# Patient Record
Sex: MALE | Race: WHITE | NOT HISPANIC OR LATINO | Employment: OTHER | URBAN - METROPOLITAN AREA
[De-identification: names, ages, dates, MRNs, and addresses within clinical notes are randomized per-mention and may not be internally consistent; named-entity substitution may affect disease eponyms.]

---

## 2023-05-30 ENCOUNTER — HOSPITAL ENCOUNTER (OUTPATIENT)
Dept: DATA CONVERSION | Facility: HOSPITAL | Age: 68
End: 2023-05-30
Attending: UROLOGY | Admitting: UROLOGY
Payer: MEDICARE

## 2023-05-30 DIAGNOSIS — M79.7 FIBROMYALGIA: ICD-10-CM

## 2023-05-30 DIAGNOSIS — R33.9 RETENTION OF URINE, UNSPECIFIED: ICD-10-CM

## 2023-05-30 DIAGNOSIS — Z86.73 PERSONAL HISTORY OF TRANSIENT ISCHEMIC ATTACK (TIA), AND CEREBRAL INFARCTION WITHOUT RESIDUAL DEFICITS: ICD-10-CM

## 2023-05-30 DIAGNOSIS — E78.5 HYPERLIPIDEMIA, UNSPECIFIED: ICD-10-CM

## 2023-05-30 DIAGNOSIS — N40.1 BENIGN PROSTATIC HYPERPLASIA WITH LOWER URINARY TRACT SYMPTOMS: ICD-10-CM

## 2023-06-05 LAB
COMPLETE PATHOLOGY REPORT: NORMAL
CONVERTED CLINICAL DIAGNOSIS-HISTORY: NORMAL
CONVERTED FINAL DIAGNOSIS: NORMAL
CONVERTED FINAL REPORT PDF LINK TO COPY AND PASTE: NORMAL
CONVERTED GROSS DESCRIPTION: NORMAL

## 2023-09-07 VITALS — HEIGHT: 68 IN | BODY MASS INDEX: 19.28 KG/M2 | WEIGHT: 127.21 LBS

## 2023-09-30 NOTE — H&P
History & Physical Reviewed:   I have reviewed the History and Physical dated:  15-May-2023   History and Physical reviewed and relevant findings noted. Patient examined to review pertinent physical  findings.: No significant changes   Home Medications Reviewed: no changes noted   Allergies Reviewed: no changes noted       ERAS (Enhanced Recovery After Surgery):  ·  ERAS Patient: no     Consent:   COVID-19 Consent:  ·  COVID-19 Risk Consent Surgeon has reviewed key risks related to the risk of jose COVID-19 and if they contract COVID-19 what the risks are.     Attestation:   Note Completion:  I am a:  Resident/Fellow   Attending Attestation I saw and evaluated the patient.  I personally obtained the key and critical portions of the history and physical exam or was physically present for key and  critical portions performed by the resident/fellow. I reviewed the resident/fellow?s documentation and discussed the patient with the resident/fellow.  I agree with the resident/fellow?s medical decision making as documented in the note.     I personally evaluated the patient on 30-May-2023         Electronic Signatures:  Ag Vargas (Resident))  (Signed 30-May-2023 06:09)   Authored: History & Physical Reviewed, ERAS, Consent,  Note Completion  Eusebio Campos)  (Signed 31-May-2023 11:23)   Authored: Note Completion   Co-Signer: History & Physical Reviewed, ERAS, Consent, Note Completion      Last Updated: 31-May-2023 11:23 by Eusebio Campos)

## 2023-12-20 ENCOUNTER — APPOINTMENT (OUTPATIENT)
Dept: UROLOGY | Facility: HOSPITAL | Age: 68
End: 2023-12-20
Payer: MEDICARE

## 2023-12-27 ENCOUNTER — TELEPHONE (OUTPATIENT)
Dept: PRIMARY CARE | Facility: CLINIC | Age: 68
End: 2023-12-27
Payer: MEDICARE

## 2023-12-27 DIAGNOSIS — E55.9 VITAMIN D DEFICIENCY: ICD-10-CM

## 2023-12-27 DIAGNOSIS — N40.1 BENIGN PROSTATIC HYPERPLASIA WITH LOWER URINARY TRACT SYMPTOMS, SYMPTOM DETAILS UNSPECIFIED: ICD-10-CM

## 2023-12-27 DIAGNOSIS — D64.9 ANEMIA, UNSPECIFIED TYPE: ICD-10-CM

## 2023-12-27 DIAGNOSIS — Z00.00 HEALTHCARE MAINTENANCE: Primary | ICD-10-CM

## 2023-12-27 PROBLEM — R97.20 INCREASED PROSTATE SPECIFIC ANTIGEN (PSA) VELOCITY: Status: ACTIVE | Noted: 2023-12-27

## 2023-12-27 PROBLEM — M79.7 FIBROMYALGIA: Status: ACTIVE | Noted: 2023-12-27

## 2023-12-27 PROBLEM — D72.819 LEUKOPENIA: Status: ACTIVE | Noted: 2023-12-27

## 2023-12-27 PROBLEM — E78.5 HYPERLIPIDEMIA: Status: ACTIVE | Noted: 2023-12-27

## 2023-12-27 PROBLEM — K57.30 DIVERTICULOSIS OF COLON: Status: ACTIVE | Noted: 2023-12-27

## 2023-12-27 PROBLEM — N21.0 CALCIUM STONE OF BLADDER: Status: ACTIVE | Noted: 2023-12-27

## 2023-12-27 PROBLEM — K58.9 IBS (IRRITABLE BOWEL SYNDROME): Status: ACTIVE | Noted: 2023-12-27

## 2023-12-27 PROBLEM — K76.9 LIVER LESION: Status: ACTIVE | Noted: 2023-12-27

## 2023-12-27 PROBLEM — R20.2 PARESTHESIA: Status: ACTIVE | Noted: 2023-12-27

## 2024-01-10 ENCOUNTER — LAB (OUTPATIENT)
Dept: LAB | Facility: LAB | Age: 69
End: 2024-01-10
Payer: MEDICARE

## 2024-01-10 DIAGNOSIS — D64.9 ANEMIA, UNSPECIFIED TYPE: ICD-10-CM

## 2024-01-10 DIAGNOSIS — Z00.00 HEALTHCARE MAINTENANCE: ICD-10-CM

## 2024-01-10 DIAGNOSIS — E55.9 VITAMIN D DEFICIENCY: ICD-10-CM

## 2024-01-10 DIAGNOSIS — N40.1 BENIGN PROSTATIC HYPERPLASIA WITH LOWER URINARY TRACT SYMPTOMS, SYMPTOM DETAILS UNSPECIFIED: ICD-10-CM

## 2024-01-10 LAB
25(OH)D3 SERPL-MCNC: 31 NG/ML (ref 30–100)
ALBUMIN SERPL BCP-MCNC: 4.3 G/DL (ref 3.4–5)
ALP SERPL-CCNC: 55 U/L (ref 33–136)
ALT SERPL W P-5'-P-CCNC: 17 U/L (ref 10–52)
ANION GAP SERPL CALC-SCNC: 11 MMOL/L (ref 10–20)
AST SERPL W P-5'-P-CCNC: 18 U/L (ref 9–39)
BASOPHILS # BLD AUTO: 0.03 X10*3/UL (ref 0–0.1)
BASOPHILS NFR BLD AUTO: 0.9 %
BILIRUB SERPL-MCNC: 0.5 MG/DL (ref 0–1.2)
BUN SERPL-MCNC: 23 MG/DL (ref 6–23)
CALCIUM SERPL-MCNC: 9 MG/DL (ref 8.6–10.3)
CHLORIDE SERPL-SCNC: 102 MMOL/L (ref 98–107)
CHOLEST SERPL-MCNC: 207 MG/DL (ref 0–199)
CHOLESTEROL/HDL RATIO: 2.4
CO2 SERPL-SCNC: 29 MMOL/L (ref 21–32)
CREAT SERPL-MCNC: 0.81 MG/DL (ref 0.5–1.3)
EGFRCR SERPLBLD CKD-EPI 2021: >90 ML/MIN/1.73M*2
EOSINOPHIL # BLD AUTO: 0.08 X10*3/UL (ref 0–0.7)
EOSINOPHIL NFR BLD AUTO: 2.4 %
ERYTHROCYTE [DISTWIDTH] IN BLOOD BY AUTOMATED COUNT: 13.6 % (ref 11.5–14.5)
EST. AVERAGE GLUCOSE BLD GHB EST-MCNC: 111 MG/DL
GLUCOSE SERPL-MCNC: 97 MG/DL (ref 74–99)
HBA1C MFR BLD: 5.5 %
HCT VFR BLD AUTO: 41.2 % (ref 41–52)
HDLC SERPL-MCNC: 86.7 MG/DL
HGB BLD-MCNC: 13.7 G/DL (ref 13.5–17.5)
IMM GRANULOCYTES # BLD AUTO: 0.01 X10*3/UL (ref 0–0.7)
IMM GRANULOCYTES NFR BLD AUTO: 0.3 % (ref 0–0.9)
LDLC SERPL CALC-MCNC: 113 MG/DL
LYMPHOCYTES # BLD AUTO: 1.33 X10*3/UL (ref 1.2–4.8)
LYMPHOCYTES NFR BLD AUTO: 39.1 %
MCH RBC QN AUTO: 31.7 PG (ref 26–34)
MCHC RBC AUTO-ENTMCNC: 33.3 G/DL (ref 32–36)
MCV RBC AUTO: 95 FL (ref 80–100)
MONOCYTES # BLD AUTO: 0.32 X10*3/UL (ref 0.1–1)
MONOCYTES NFR BLD AUTO: 9.4 %
NEUTROPHILS # BLD AUTO: 1.63 X10*3/UL (ref 1.2–7.7)
NEUTROPHILS NFR BLD AUTO: 47.9 %
NON HDL CHOLESTEROL: 120 MG/DL (ref 0–149)
NRBC BLD-RTO: 0 /100 WBCS (ref 0–0)
PLATELET # BLD AUTO: 207 X10*3/UL (ref 150–450)
POTASSIUM SERPL-SCNC: 3.9 MMOL/L (ref 3.5–5.3)
PROT SERPL-MCNC: 6.3 G/DL (ref 6.4–8.2)
PSA SERPL-MCNC: 1.03 NG/ML
RBC # BLD AUTO: 4.32 X10*6/UL (ref 4.5–5.9)
SODIUM SERPL-SCNC: 138 MMOL/L (ref 136–145)
TRIGL SERPL-MCNC: 39 MG/DL (ref 0–149)
VIT B12 SERPL-MCNC: 457 PG/ML (ref 211–911)
VLDL: 8 MG/DL (ref 0–40)
WBC # BLD AUTO: 3.4 X10*3/UL (ref 4.4–11.3)

## 2024-01-10 PROCEDURE — 83036 HEMOGLOBIN GLYCOSYLATED A1C: CPT

## 2024-01-10 PROCEDURE — 82306 VITAMIN D 25 HYDROXY: CPT

## 2024-01-10 PROCEDURE — 85025 COMPLETE CBC W/AUTO DIFF WBC: CPT

## 2024-01-10 PROCEDURE — 82607 VITAMIN B-12: CPT

## 2024-01-10 PROCEDURE — 36415 COLL VENOUS BLD VENIPUNCTURE: CPT

## 2024-01-10 PROCEDURE — 80053 COMPREHEN METABOLIC PANEL: CPT

## 2024-01-10 PROCEDURE — 84153 ASSAY OF PSA TOTAL: CPT

## 2024-01-10 PROCEDURE — 80061 LIPID PANEL: CPT

## 2024-01-14 DIAGNOSIS — D72.819 LEUKOPENIA, UNSPECIFIED TYPE: Primary | ICD-10-CM

## 2024-01-22 ENCOUNTER — OFFICE VISIT (OUTPATIENT)
Dept: PRIMARY CARE | Facility: CLINIC | Age: 69
End: 2024-01-22
Payer: MEDICARE

## 2024-01-22 VITALS
DIASTOLIC BLOOD PRESSURE: 71 MMHG | WEIGHT: 139 LBS | RESPIRATION RATE: 18 BRPM | SYSTOLIC BLOOD PRESSURE: 130 MMHG | OXYGEN SATURATION: 98 % | BODY MASS INDEX: 20.59 KG/M2 | TEMPERATURE: 97.4 F | HEART RATE: 54 BPM | HEIGHT: 69 IN

## 2024-01-22 DIAGNOSIS — N40.1 BENIGN PROSTATIC HYPERPLASIA WITH URINARY FREQUENCY: ICD-10-CM

## 2024-01-22 DIAGNOSIS — E46 PROTEIN-CALORIE MALNUTRITION, UNSPECIFIED SEVERITY (MULTI): ICD-10-CM

## 2024-01-22 DIAGNOSIS — Z00.00 ROUTINE GENERAL MEDICAL EXAMINATION AT HEALTH CARE FACILITY: Primary | ICD-10-CM

## 2024-01-22 DIAGNOSIS — R35.0 BENIGN PROSTATIC HYPERPLASIA WITH URINARY FREQUENCY: ICD-10-CM

## 2024-01-22 DIAGNOSIS — D72.819 LEUKOPENIA, UNSPECIFIED TYPE: ICD-10-CM

## 2024-01-22 DIAGNOSIS — K58.0 IRRITABLE BOWEL SYNDROME WITH DIARRHEA: ICD-10-CM

## 2024-01-22 PROCEDURE — 99213 OFFICE O/P EST LOW 20 MIN: CPT | Performed by: FAMILY MEDICINE

## 2024-01-22 PROCEDURE — 1036F TOBACCO NON-USER: CPT | Performed by: FAMILY MEDICINE

## 2024-01-22 PROCEDURE — G0439 PPPS, SUBSEQ VISIT: HCPCS | Performed by: FAMILY MEDICINE

## 2024-01-22 PROCEDURE — 1125F AMNT PAIN NOTED PAIN PRSNT: CPT | Performed by: FAMILY MEDICINE

## 2024-01-22 PROCEDURE — 1159F MED LIST DOCD IN RCRD: CPT | Performed by: FAMILY MEDICINE

## 2024-01-22 PROCEDURE — 1170F FXNL STATUS ASSESSED: CPT | Performed by: FAMILY MEDICINE

## 2024-01-22 RX ORDER — TAMSULOSIN HYDROCHLORIDE 0.4 MG/1
0.8 CAPSULE ORAL DAILY
COMMUNITY

## 2024-01-22 ASSESSMENT — ACTIVITIES OF DAILY LIVING (ADL)
GROCERY_SHOPPING: INDEPENDENT
BATHING: INDEPENDENT
DRESSING: INDEPENDENT
MANAGING_FINANCES: INDEPENDENT
DOING_HOUSEWORK: INDEPENDENT
TAKING_MEDICATION: INDEPENDENT

## 2024-01-22 ASSESSMENT — PATIENT HEALTH QUESTIONNAIRE - PHQ9
1. LITTLE INTEREST OR PLEASURE IN DOING THINGS: NOT AT ALL
SUM OF ALL RESPONSES TO PHQ9 QUESTIONS 1 AND 2: 0
2. FEELING DOWN, DEPRESSED OR HOPELESS: NOT AT ALL

## 2024-01-22 ASSESSMENT — ENCOUNTER SYMPTOMS
HEADACHES: 0
OCCASIONAL FEELINGS OF UNSTEADINESS: 0
SHORTNESS OF BREATH: 0
DEPRESSION: 0
LOSS OF SENSATION IN FEET: 0

## 2024-01-22 NOTE — PROGRESS NOTES
"Subjective     Chance Chiu is a 68 y.o. male who presents for Medicare Annual Wellness Visit Subsequent.    HPI     Pt is here today for annual well exam.  Pt is doing well.  He has BPH, sees urology.  He reports being unable to sit for jury duty due to increased urination.      Review of Systems   Respiratory:  Negative for shortness of breath.    Cardiovascular:  Negative for chest pain.   Neurological:  Negative for headaches.       Objective     Vitals:    01/22/24 1426   BP: 130/71   BP Location: Right arm   Patient Position: Sitting   Pulse: 54   Resp: 18   Temp: 36.3 °C (97.4 °F)   TempSrc: Temporal   SpO2: 98%   Weight: 63 kg (139 lb)   Height: 1.753 m (5' 9\")        Current Outpatient Medications   Medication Instructions    solifenacin (VESICARE) 5 mg, oral, Daily, Swallow tablet whole; do not crush, chew, or split.    tamsulosin (FLOMAX) 0.8 mg, oral, Daily        Past Surgical History:   Procedure Laterality Date    OTHER SURGICAL HISTORY  05/15/2019    Hernia repair    OTHER SURGICAL HISTORY  05/24/2022    Prostate biopsy    OTHER SURGICAL HISTORY  05/24/2022    Insertion of prostatic urethral lift implant        Social History     Tobacco Use    Smoking status: Never    Smokeless tobacco: Never   Vaping Use    Vaping Use: Never used        No family history on file.     Immunization History   Administered Date(s) Administered    Flu vaccine, quadrivalent, high-dose, preservative free, age 65y+ (FLUZONE) 10/07/2021, 10/12/2022, 10/13/2023    Flu vaccine, quadrivalent, no egg protein, age 6 month or greater (FLUCELVAX) 11/03/2017    Influenza, injectable, quadrivalent 10/23/2019    Pfizer COVID-19 vaccine, Fall 2023, 12 years and older, (30mcg/0.3mL) 10/13/2023    Pfizer COVID-19 vaccine, bivalent, age 12 years and older (30 mcg/0.3 mL) 10/12/2022    Pfizer Gray Cap SARS-CoV-2 04/14/2022    Pfizer Purple Cap SARS-CoV-2 03/11/2021, 04/08/2021, 10/07/2021    Pneumococcal conjugate vaccine, 20-valent " (PREVNAR 20) 06/15/2022    RESPIRATORY SYNCYTIAL VIRUS (RSV), ELIGIBLE PREGNANT PTS, 0.5 ML (ABRYSVO) 10/13/2023    Zoster vaccine, recombinant, adult (SHINGRIX) 04/14/2022, 06/15/2022        Physical Exam  Vitals reviewed.   Constitutional:       General: He is not in acute distress.     Comments: Thin body habitus    HENT:      Head: Normocephalic and atraumatic.      Right Ear: Tympanic membrane, ear canal and external ear normal.      Left Ear: Tympanic membrane, ear canal and external ear normal.      Nose: Nose normal.      Mouth/Throat:      Mouth: Mucous membranes are moist.      Pharynx: Oropharynx is clear. No oropharyngeal exudate or posterior oropharyngeal erythema.   Eyes:      Extraocular Movements: Extraocular movements intact.      Pupils: Pupils are equal, round, and reactive to light.   Neck:      Thyroid: No thyroid mass or thyromegaly.   Cardiovascular:      Rate and Rhythm: Normal rate and regular rhythm.      Heart sounds: No murmur heard.  Pulmonary:      Effort: Pulmonary effort is normal. No respiratory distress.      Breath sounds: Normal breath sounds. No wheezing, rhonchi or rales.   Abdominal:      General: Abdomen is flat. There is no distension.      Palpations: Abdomen is soft.      Tenderness: There is no abdominal tenderness.   Musculoskeletal:         General: Normal range of motion.   Lymphadenopathy:      Cervical: No cervical adenopathy.   Skin:     General: Skin is warm and dry.      Findings: No rash.   Neurological:      General: No focal deficit present.      Mental Status: He is alert and oriented to person, place, and time. Mental status is at baseline.   Psychiatric:         Mood and Affect: Mood normal.         Behavior: Behavior normal.         Problem List Items Addressed This Visit       Leukopenia    Enlarged prostate with lower urinary tract symptoms (LUTS)    IBS (irritable bowel syndrome)    Protein-calorie malnutrition, unspecified severity (CMS/HCC)     Other  Visit Diagnoses       Routine general medical examination at health care facility    -  Primary            Assessment/Plan     Medicare well exam    Colon screening - cologuard negative 7/2022, repeat in 2025.      Shingrix vaccine status - utd    Pneumonia vaccine status - utd    Tdap - recommended    Healthy diet, routine exercise was discussed with patient      Living will/MPOA discussed    Screening questions completed (Fall /depression/ADL/IADL/Home Safety/Functional ability)    Unable to serve on jury duty due to BPH and IBS issues.  Letter provided.     Leukopenia - evaluated by  heme in the past     Hld - mild     BPH - sees  urology    Reviewed recent labs today     Underweight - discussed increasing protein in diet, take MVI.    Follow up in  1 year or sooner if needed

## 2024-03-06 ENCOUNTER — TELEMEDICINE (OUTPATIENT)
Dept: UROLOGY | Facility: HOSPITAL | Age: 69
End: 2024-03-06
Payer: MEDICARE

## 2024-03-06 DIAGNOSIS — N40.1 BENIGN PROSTATIC HYPERPLASIA WITH URINARY FREQUENCY: Primary | ICD-10-CM

## 2024-03-06 DIAGNOSIS — R35.0 BENIGN PROSTATIC HYPERPLASIA WITH URINARY FREQUENCY: Primary | ICD-10-CM

## 2024-03-06 DIAGNOSIS — R35.1 NOCTURIA: ICD-10-CM

## 2024-03-06 PROCEDURE — 1036F TOBACCO NON-USER: CPT | Performed by: UROLOGY

## 2024-03-06 PROCEDURE — 1125F AMNT PAIN NOTED PAIN PRSNT: CPT | Performed by: UROLOGY

## 2024-03-06 PROCEDURE — 99213 OFFICE O/P EST LOW 20 MIN: CPT | Performed by: UROLOGY

## 2024-03-06 PROCEDURE — 1159F MED LIST DOCD IN RCRD: CPT | Performed by: UROLOGY

## 2024-03-06 PROCEDURE — 1157F ADVNC CARE PLAN IN RCRD: CPT | Performed by: UROLOGY

## 2024-03-06 NOTE — PROGRESS NOTES
Virtual or Telephone Consent    An interactive audio and video telecommunication system which permits real time communications between the patient (at the originating site) and provider (at the distant site) was utilized to provide this telehealth service.   Verbal consent was requested and obtained from Chance Chiu on this date, 03/06/24 for a telehealth visit.     Virtual or Telephone Consent    A telephone visit (audio only) between the patient (at the originating site) and the provider (at the distant site) was utilized to provide this telehealth service.   Verbal consent was requested and obtained from Chance Chiu on this date, 03/06/24 for a telehealth visit.     HPI  Per Dr. Bassett note dated 6/15/23    68y  male who presents for f/u s/p HOLEP and 100 units of Botox on May 30 with Dr. Campos. He subsequently passed TOV and learned ISC as he was concerned regarding possible recurrence of retention because this has been an issue for him in the past. He states that his symptoms have slightly improved since his last visit. He is still taking tamsulosin and denies needing to self catheterize. He feels that he is emptying well. Denies SS of UTI. Pleased at this time.     Path- 6.2g benign.     6/29/23- Patient is emptying bladder well. No retention. No issues. No cath. Tried stopping flomax, but feels like he still needs it. LUTS are significantly improved from surgery, does not want to do anything different at this time.     9/27/23 - seen via thv for a 3mo fuv sp HoLEP w botox. The patient is on flomax still, wants to take 2 a day. not having to cath. stream is strong.         1/19/24 - seen via  for symptom check. Continues on Flomax. Reports continued bothersome frequency and nocturia. He is getting up to void every 2 hours and reports a weak stream during the night. His stream is strong during the day.        3/6/24 - seen today via thv for 6 week symptom check after starting Vesicare 5mg po  qd. Stopped the vesicare, no better. Has been told in the past he has a small bladder, he recalls 160 or so ml. Not really that bothered at this point.     Lab Results   Component Value Date    PSA 1.03 01/10/2024    PSA 1.79 10/12/2021    PSA 0.80 10/08/2020    PSA 1.93 04/26/2019    PSA 0.82 04/19/2018         Current Medications:  Current Outpatient Medications   Medication Sig Dispense Refill    solifenacin (VESIcare) 5 mg tablet Take 1 tablet (5 mg) by mouth once daily. Swallow tablet whole; do not crush, chew, or split. 30 tablet 5    tamsulosin (Flomax) 0.4 mg 24 hr capsule Take 2 capsules (0.8 mg) by mouth once daily.       No current facility-administered medications for this visit.        Active Problems:  Chance Chiu is a 69 y.o. male with the following Problems and Medications.  Patient Active Problem List   Diagnosis    Leukopenia    Hyperlipidemia    Enlarged prostate with lower urinary tract symptoms (LUTS)    Anemia    Diverticulosis of colon    Calcium stone of bladder    Fibromyalgia    IBS (irritable bowel syndrome)    Increased prostate specific antigen (PSA) velocity    Liver lesion    Paresthesia    Protein-calorie malnutrition, unspecified severity (CMS/HCC)     Current Outpatient Medications   Medication Sig Dispense Refill    solifenacin (VESIcare) 5 mg tablet Take 1 tablet (5 mg) by mouth once daily. Swallow tablet whole; do not crush, chew, or split. 30 tablet 5    tamsulosin (Flomax) 0.4 mg 24 hr capsule Take 2 capsules (0.8 mg) by mouth once daily.       No current facility-administered medications for this visit.       PMH:  Past Medical History:   Diagnosis Date    Personal history of other diseases of male genital organs 10/23/2019    History of benign prostatic hyperplasia    Personal history of other diseases of the digestive system     History of irritable bowel syndrome    Personal history of other diseases of the digestive system     History of diverticulosis    Personal  history of other specified conditions 03/28/2022    History of urinary retention       PSH:  Past Surgical History:   Procedure Laterality Date    OTHER SURGICAL HISTORY  05/15/2019    Hernia repair    OTHER SURGICAL HISTORY  05/24/2022    Prostate biopsy    OTHER SURGICAL HISTORY  05/24/2022    Insertion of prostatic urethral lift implant       FMH:  No family history on file.    SHx:  Social History     Tobacco Use    Smoking status: Never    Smokeless tobacco: Never   Vaping Use    Vaping Use: Never used       Allergies:  No Known Allergies    Assessment/Plan  Really no improvement on vesicare, stopped this. Discussed another med vs botox vs neuromodulation, for now is ok where he is at and would like to just keep an eye on things.    Fu 6 mo over v for symptom check    Scribe Attestation  By signing my name below, I, Idania Esparza, Scribe, attest that this documentation  has been prepared under the direction and in the presence of Eusebio Campos MD.

## 2024-03-27 ENCOUNTER — LAB (OUTPATIENT)
Dept: LAB | Facility: LAB | Age: 69
End: 2024-03-27
Payer: MEDICARE

## 2024-03-27 DIAGNOSIS — D72.819 LEUKOPENIA, UNSPECIFIED TYPE: ICD-10-CM

## 2024-03-27 LAB
BASOPHILS # BLD AUTO: 0.03 X10*3/UL (ref 0–0.1)
BASOPHILS NFR BLD AUTO: 0.7 %
EOSINOPHIL # BLD AUTO: 0.06 X10*3/UL (ref 0–0.7)
EOSINOPHIL NFR BLD AUTO: 1.5 %
ERYTHROCYTE [DISTWIDTH] IN BLOOD BY AUTOMATED COUNT: 12.9 % (ref 11.5–14.5)
HCT VFR BLD AUTO: 39.9 % (ref 41–52)
HGB BLD-MCNC: 13.7 G/DL (ref 13.5–17.5)
IMM GRANULOCYTES # BLD AUTO: 0.01 X10*3/UL (ref 0–0.7)
IMM GRANULOCYTES NFR BLD AUTO: 0.2 % (ref 0–0.9)
LYMPHOCYTES # BLD AUTO: 1.24 X10*3/UL (ref 1.2–4.8)
LYMPHOCYTES NFR BLD AUTO: 30.7 %
MCH RBC QN AUTO: 32.5 PG (ref 26–34)
MCHC RBC AUTO-ENTMCNC: 34.3 G/DL (ref 32–36)
MCV RBC AUTO: 95 FL (ref 80–100)
MONOCYTES # BLD AUTO: 0.37 X10*3/UL (ref 0.1–1)
MONOCYTES NFR BLD AUTO: 9.2 %
NEUTROPHILS # BLD AUTO: 2.33 X10*3/UL (ref 1.2–7.7)
NEUTROPHILS NFR BLD AUTO: 57.7 %
NRBC BLD-RTO: 0 /100 WBCS (ref 0–0)
PLATELET # BLD AUTO: 200 X10*3/UL (ref 150–450)
RBC # BLD AUTO: 4.22 X10*6/UL (ref 4.5–5.9)
WBC # BLD AUTO: 4 X10*3/UL (ref 4.4–11.3)

## 2024-03-27 PROCEDURE — 36415 COLL VENOUS BLD VENIPUNCTURE: CPT

## 2024-03-27 PROCEDURE — 85025 COMPLETE CBC W/AUTO DIFF WBC: CPT

## 2024-06-20 DIAGNOSIS — N40.1 BENIGN PROSTATIC HYPERPLASIA WITH URINARY FREQUENCY: Primary | ICD-10-CM

## 2024-06-20 DIAGNOSIS — R35.0 BENIGN PROSTATIC HYPERPLASIA WITH URINARY FREQUENCY: Primary | ICD-10-CM

## 2024-06-21 RX ORDER — TAMSULOSIN HYDROCHLORIDE 0.4 MG/1
0.8 CAPSULE ORAL DAILY
Qty: 180 CAPSULE | Refills: 3 | Status: SHIPPED | OUTPATIENT
Start: 2024-06-21 | End: 2025-06-21

## 2024-07-31 ENCOUNTER — TELEPHONE (OUTPATIENT)
Dept: OPHTHALMOLOGY | Facility: CLINIC | Age: 69
End: 2024-07-31
Payer: MEDICARE

## 2024-07-31 NOTE — TELEPHONE ENCOUNTER
CALLED PATIENT TO GET SCHEDULED FOR [facility] SERVICES     PER PATIENT HE HAS ALREADY BEEN SEEN AND ESTABLISHED CARE ELSEWHERE.    -awde

## 2024-09-12 ENCOUNTER — TELEMEDICINE (OUTPATIENT)
Dept: UROLOGY | Facility: HOSPITAL | Age: 69
End: 2024-09-12
Payer: MEDICARE

## 2024-09-12 DIAGNOSIS — N40.1 BENIGN PROSTATIC HYPERPLASIA WITH URINARY FREQUENCY: ICD-10-CM

## 2024-09-12 DIAGNOSIS — R35.0 BENIGN PROSTATIC HYPERPLASIA WITH URINARY FREQUENCY: ICD-10-CM

## 2024-09-12 PROCEDURE — G2211 COMPLEX E/M VISIT ADD ON: HCPCS | Performed by: UROLOGY

## 2024-09-12 PROCEDURE — 99214 OFFICE O/P EST MOD 30 MIN: CPT | Performed by: UROLOGY

## 2024-09-12 PROCEDURE — 1157F ADVNC CARE PLAN IN RCRD: CPT | Performed by: UROLOGY

## 2024-09-12 RX ORDER — TAMSULOSIN HYDROCHLORIDE 0.4 MG/1
0.8 CAPSULE ORAL DAILY
Qty: 180 CAPSULE | Refills: 3 | Status: SHIPPED | OUTPATIENT
Start: 2024-09-12 | End: 2025-09-12

## 2024-09-12 NOTE — PROGRESS NOTES
Virtual or Telephone Consent    An interactive audio and video telecommunication system which permits real time communications between the patient (at the originating site) and provider (at the distant site) was utilized to provide this telehealth service.   Verbal consent was requested and obtained from Chance Chiu on this date, 09/12/24 for a telehealth visit.     HPI    69 y.o. male being seen with the following problem list:    Problem list:  Urinary retention, now s/p HOLEP and 100 units of Botox 5/30/23    Per Dr. Bassett note dated 6/15/23   He subsequently passed TOV and learned ISC as he was concerned regarding possible recurrence of retention because this has been an issue for him in the past. He states that his symptoms have slightly improved since his last visit. He is still taking tamsulosin and denies needing to self catheterize. He feels that he is emptying well. Denies SS of UTI. Pleased at this time.     Path- 6.2g benign.     6/29/23- Patient is emptying bladder well. No retention. No issues. No cath. Tried stopping flomax, but feels like he still needs it. LUTS are significantly improved from surgery, does not want to do anything different at this time.     9/27/23 - seen via thv for a 3mo fuv sp HoLEP w botox. The patient is on flomax still, wants to take 2 a day. not having to cath. stream is strong.      1/19/24 - seen via th for symptom check. Continues on Flomax. Reports continued bothersome frequency and nocturia. He is getting up to void every 2 hours and reports a weak stream during the night. His stream is strong during the day.     3/6/24 - seen today via thv for 6 week symptom check after starting Vesicare 5mg po qd. Stopped the vesicare, no better. Has been told in the past he has a small bladder, he recalls 160 or so ml. Not really that bothered at this point.        09/12/24 - Doing well, urinary symptoms not too bothersome. Would like to continue on 0.8mg flomax. Does not want to  do anything differently      Lab Results   Component Value Date    PSA 1.03 01/10/2024    PSA 1.79 10/12/2021    PSA 0.80 10/08/2020    PSA 1.93 04/26/2019    PSA 0.82 04/19/2018         Current Medications:  Current Outpatient Medications   Medication Sig Dispense Refill    tamsulosin (Flomax) 0.4 mg 24 hr capsule Take 2 capsules (0.8 mg) by mouth once daily. 180 capsule 3     No current facility-administered medications for this visit.        Active Problems:  Chance Chiu is a 69 y.o. male with the following Problems and Medications.  Patient Active Problem List   Diagnosis    Leukopenia    Hyperlipidemia    Enlarged prostate with lower urinary tract symptoms (LUTS)    Anemia    Diverticulosis of colon    Calcium stone of bladder    Fibromyalgia    IBS (irritable bowel syndrome)    Increased prostate specific antigen (PSA) velocity    Liver lesion    Paresthesia    Protein-calorie malnutrition, unspecified severity (Multi)     Current Outpatient Medications   Medication Sig Dispense Refill    tamsulosin (Flomax) 0.4 mg 24 hr capsule Take 2 capsules (0.8 mg) by mouth once daily. 180 capsule 3     No current facility-administered medications for this visit.       PMH:  Past Medical History:   Diagnosis Date    Personal history of other diseases of male genital organs 10/23/2019    History of benign prostatic hyperplasia    Personal history of other diseases of the digestive system     History of irritable bowel syndrome    Personal history of other diseases of the digestive system     History of diverticulosis    Personal history of other specified conditions 03/28/2022    History of urinary retention       PSH:  Past Surgical History:   Procedure Laterality Date    OTHER SURGICAL HISTORY  05/15/2019    Hernia repair    OTHER SURGICAL HISTORY  05/24/2022    Prostate biopsy    OTHER SURGICAL HISTORY  05/24/2022    Insertion of prostatic urethral lift implant       FMH:  No family history on file.    SHx:  Social  History     Tobacco Use    Smoking status: Never    Smokeless tobacco: Never   Vaping Use    Vaping status: Never Used       Allergies:  No Known Allergies      Assessment/Plan  Doing well, not bothered by his urinary symptoms. Would like to continue on 0.8mg tamsulosin, will order this for him.     Will follow up in 6 months over TH.     Scribe Attestation  By signing my name below, I, Jose LuisMacy Shultz, attest that this documentation  has been prepared under the direction and in the presence of Eusebio Campos MD.

## 2024-11-05 ENCOUNTER — HOSPITAL ENCOUNTER (EMERGENCY)
Facility: HOSPITAL | Age: 69
Discharge: HOME | End: 2024-11-05
Attending: EMERGENCY MEDICINE
Payer: MEDICARE

## 2024-11-05 ENCOUNTER — OFFICE VISIT (OUTPATIENT)
Dept: URGENT CARE | Age: 69
End: 2024-11-05
Payer: MEDICARE

## 2024-11-05 ENCOUNTER — APPOINTMENT (OUTPATIENT)
Dept: CARDIOLOGY | Facility: HOSPITAL | Age: 69
End: 2024-11-05
Payer: MEDICARE

## 2024-11-05 ENCOUNTER — APPOINTMENT (OUTPATIENT)
Dept: RADIOLOGY | Facility: HOSPITAL | Age: 69
End: 2024-11-05
Payer: MEDICARE

## 2024-11-05 VITALS
HEIGHT: 69 IN | DIASTOLIC BLOOD PRESSURE: 59 MMHG | WEIGHT: 145 LBS | OXYGEN SATURATION: 97 % | RESPIRATION RATE: 16 BRPM | SYSTOLIC BLOOD PRESSURE: 118 MMHG | BODY MASS INDEX: 21.48 KG/M2 | HEART RATE: 56 BPM | TEMPERATURE: 98.1 F

## 2024-11-05 VITALS
RESPIRATION RATE: 16 BRPM | BODY MASS INDEX: 21.48 KG/M2 | SYSTOLIC BLOOD PRESSURE: 144 MMHG | HEART RATE: 62 BPM | WEIGHT: 145 LBS | OXYGEN SATURATION: 94 % | TEMPERATURE: 99.1 F | HEIGHT: 69 IN | DIASTOLIC BLOOD PRESSURE: 69 MMHG

## 2024-11-05 DIAGNOSIS — R60.0 LOWER EXTREMITY EDEMA: Primary | ICD-10-CM

## 2024-11-05 DIAGNOSIS — M79.89 OTHER SPECIFIED SOFT TISSUE DISORDERS: ICD-10-CM

## 2024-11-05 DIAGNOSIS — M79.605 PAIN IN LEFT LEG: ICD-10-CM

## 2024-11-05 DIAGNOSIS — I82.4Y2 DEEP VEIN THROMBOSIS (DVT) OF PROXIMAL VEIN OF LEFT LOWER EXTREMITY, UNSPECIFIED CHRONICITY (MULTI): Primary | ICD-10-CM

## 2024-11-05 LAB
ABO GROUP (TYPE) IN BLOOD: NORMAL
ANTIBODY SCREEN: NORMAL
INR PPP: 1.2 (ref 0.9–1.1)
PROTHROMBIN TIME: 13.1 SECONDS (ref 9.8–12.8)
RH FACTOR (ANTIGEN D): NORMAL

## 2024-11-05 PROCEDURE — 1036F TOBACCO NON-USER: CPT | Performed by: PHYSICIAN ASSISTANT

## 2024-11-05 PROCEDURE — 73560 X-RAY EXAM OF KNEE 1 OR 2: CPT | Mod: LT

## 2024-11-05 PROCEDURE — 73630 X-RAY EXAM OF FOOT: CPT | Mod: LEFT SIDE | Performed by: RADIOLOGY

## 2024-11-05 PROCEDURE — 85610 PROTHROMBIN TIME: CPT

## 2024-11-05 PROCEDURE — 1157F ADVNC CARE PLAN IN RCRD: CPT | Performed by: PHYSICIAN ASSISTANT

## 2024-11-05 PROCEDURE — 73590 X-RAY EXAM OF LOWER LEG: CPT | Mod: LT

## 2024-11-05 PROCEDURE — 93971 EXTREMITY STUDY: CPT

## 2024-11-05 PROCEDURE — 73590 X-RAY EXAM OF LOWER LEG: CPT | Mod: LEFT SIDE | Performed by: RADIOLOGY

## 2024-11-05 PROCEDURE — 73560 X-RAY EXAM OF KNEE 1 OR 2: CPT | Mod: LEFT SIDE | Performed by: RADIOLOGY

## 2024-11-05 PROCEDURE — 2500000001 HC RX 250 WO HCPCS SELF ADMINISTERED DRUGS (ALT 637 FOR MEDICARE OP)

## 2024-11-05 PROCEDURE — 99284 EMERGENCY DEPT VISIT MOD MDM: CPT | Mod: 25

## 2024-11-05 PROCEDURE — 1159F MED LIST DOCD IN RCRD: CPT | Performed by: PHYSICIAN ASSISTANT

## 2024-11-05 PROCEDURE — 36415 COLL VENOUS BLD VENIPUNCTURE: CPT

## 2024-11-05 PROCEDURE — 86901 BLOOD TYPING SEROLOGIC RH(D): CPT

## 2024-11-05 PROCEDURE — 99213 OFFICE O/P EST LOW 20 MIN: CPT | Performed by: PHYSICIAN ASSISTANT

## 2024-11-05 PROCEDURE — 3008F BODY MASS INDEX DOCD: CPT | Performed by: PHYSICIAN ASSISTANT

## 2024-11-05 PROCEDURE — 73630 X-RAY EXAM OF FOOT: CPT | Mod: LT

## 2024-11-05 RX ORDER — DORZOLAMIDE HYDROCHLORIDE AND TIMOLOL MALEATE 20; 5 MG/ML; MG/ML
SOLUTION/ DROPS OPHTHALMIC
COMMUNITY
Start: 2024-08-10

## 2024-11-05 ASSESSMENT — PAIN - FUNCTIONAL ASSESSMENT: PAIN_FUNCTIONAL_ASSESSMENT: 0-10

## 2024-11-05 ASSESSMENT — PAIN DESCRIPTION - ORIENTATION: ORIENTATION: LEFT;LOWER

## 2024-11-05 ASSESSMENT — LIFESTYLE VARIABLES
EVER FELT BAD OR GUILTY ABOUT YOUR DRINKING: NO
HAVE YOU EVER FELT YOU SHOULD CUT DOWN ON YOUR DRINKING: NO
TOTAL SCORE: 0
HAVE PEOPLE ANNOYED YOU BY CRITICIZING YOUR DRINKING: NO
EVER HAD A DRINK FIRST THING IN THE MORNING TO STEADY YOUR NERVES TO GET RID OF A HANGOVER: NO

## 2024-11-05 ASSESSMENT — PAIN DESCRIPTION - LOCATION: LOCATION: LEG

## 2024-11-05 ASSESSMENT — PAIN SCALES - GENERAL: PAINLEVEL_OUTOF10: 7

## 2024-11-05 ASSESSMENT — COLUMBIA-SUICIDE SEVERITY RATING SCALE - C-SSRS
2. HAVE YOU ACTUALLY HAD ANY THOUGHTS OF KILLING YOURSELF?: NO
6. HAVE YOU EVER DONE ANYTHING, STARTED TO DO ANYTHING, OR PREPARED TO DO ANYTHING TO END YOUR LIFE?: NO
1. IN THE PAST MONTH, HAVE YOU WISHED YOU WERE DEAD OR WISHED YOU COULD GO TO SLEEP AND NOT WAKE UP?: NO

## 2024-11-05 ASSESSMENT — PAIN DESCRIPTION - PAIN TYPE: TYPE: ACUTE PAIN

## 2024-11-05 NOTE — PROGRESS NOTES
"Subjective   Patient ID: Chance Chiu is a 69 y.o. male. They present today with a chief complaint of Other (Left foot swelling and having pain. Going on x5 days. No injury ).    History of Present Illness  This is a 69-year-old male who presents to the urgent care with complaints of isolated lower extremity edema.  Patient states he noticed swelling in his left lower extremity since last week that is worsening.  Patient denies any period of immobilization or recent surgery.  Patient states it is tender on his posterior calf.  Patient denies any abrasions or lacerations to the area.  Patient denies any fevers or chills.  Patient denies any other systemic complaints at this time.          Past Medical History  Allergies as of 11/05/2024    (No Known Allergies)       (Not in a hospital admission)       Past Medical History:   Diagnosis Date    Personal history of other diseases of male genital organs 10/23/2019    History of benign prostatic hyperplasia    Personal history of other diseases of the digestive system     History of irritable bowel syndrome    Personal history of other diseases of the digestive system     History of diverticulosis    Personal history of other specified conditions 03/28/2022    History of urinary retention       Past Surgical History:   Procedure Laterality Date    OTHER SURGICAL HISTORY  05/15/2019    Hernia repair    OTHER SURGICAL HISTORY  05/24/2022    Prostate biopsy    OTHER SURGICAL HISTORY  05/24/2022    Insertion of prostatic urethral lift implant        reports that he has never smoked. He has never used smokeless tobacco.    Review of Systems  Review of Systems   All other systems reviewed and are negative.                                 Objective    Vitals:    11/05/24 1342   BP: 118/59   BP Location: Left arm   Patient Position: Sitting   Pulse: 56   Resp: 16   Temp: 36.7 °C (98.1 °F)   TempSrc: Oral   SpO2: 97%   Weight: 65.8 kg (145 lb)   Height: 1.753 m (5' 9\")     No " LMP for male patient.    Physical Exam  Vitals and nursing note reviewed.   Constitutional:       Appearance: Normal appearance.   HENT:      Head: Normocephalic and atraumatic.   Pulmonary:      Comments: No dyspnea  Musculoskeletal:         General: Swelling and tenderness present. No deformity or signs of injury.      Cervical back: Neck supple.      Left lower leg: Edema present.      Comments: + Isolated lower extremity edema to the left calf. Pulses 2+, compartments soft, pitting 1+.    Skin:     General: Skin is warm and dry.   Neurological:      Mental Status: He is alert.         Procedures    Point of Care Test & Imaging Results from this visit  No results found for this visit on 11/05/24.   No results found.    Diagnostic study results (if any) were reviewed by Marybel Boyd PA-C.    Assessment/Plan   Allergies, medications, history, and pertinent labs/EKGs/Imaging reviewed by Marybel Boyd PA-C.     Medical Decision Making  Isolated Lower extremity edema-discussed with patient due to isolated lower extremity edema and tenderness without erythema/abrasions or laceration to the left calf patient is to go to ER for DVT rule out ultrasound workup.  We are limited here in urgent care.  Patient is agreeable to go by private vehicle to [facility]    Orders and Diagnoses  Diagnoses and all orders for this visit:  Lower extremity edema      Medical Admin Record      Patient disposition: ED    Electronically signed by Marybel Boyd PA-C  1:56 PM

## 2024-11-05 NOTE — ED PROVIDER NOTES
Emergency Department Provider Note        History of Present Illness     History provided by: Patient  Limitations to History: None  External Records Reviewed with Brief Summary:  Medical chart review    HPI:  Chance Chiu is a 69 y.o. male with a past medical history of hyperlipidemia, fibromyalgia.  BPH, IBS who presents with a approximately week long swelling in his left lower extremity since last week that is worsening. Patient denies any period of immobilization or recent surgery. Patient states it is tender on his posterior calf.  Patient currently only taking Flomax for BPH as well as finasteride.  Patient arrives today with chief complaint of left lower leg swelling, he believes he may have stepped on something on the arch of his foot however he does not know why the left leg is more swollen than the right.  He denies any medical history of smoking, denies blood clot history, denies DVTs, denies history of pulmonary embolism.  Patient states he has had no acute injury to it.  He states he has never had anything like this before.  He denies chest pain, denies shortness of breath, denies nausea or vomiting, denies blood in stool or urine, denies change in stooling or urination.  He indicates that the pain is primarily in the popliteal fossa and caudad.    Physical Exam   Triage vitals:  T 37.3 °C (99.1 °F)  HR 55  /59  RR 15  O2 99 % None (Room air)    General: Awake, alert, in no acute distress  Eyes: Gaze conjugate.  No scleral icterus or injection  HENT: Normo-cephalic, atraumatic. No stridor  CV: Regular rate, regular rhythm. Radial pulses 2+ bilaterally  Resp: Breathing non-labored, speaking in full sentences.  Clear to auscultation bilaterally  GI: Soft, non-distended, non-tender. No rebound or guarding.  : Deferred  MSK/Extremities: No gross bony deformities. Moving all extremities, left lower extremity is larger in appearance than right lower extremity, there is swelling and edema that  is nonpitting to the ankle and foot.  There are no signs of obvious trauma, skin is not warm or cellulitic in appearance.  Skin: Warm. Appropriate color  Neuro: Alert. Oriented. Face symmetric. Speech is fluent.  Gross strength and sensation intact in b/l UE and LEs  Psych: Appropriate mood and affect    Medical Decision Making & ED Course   Medical Decision Makin y.o. male arrives with chief complaint of left lower leg swelling.  Plain film x-rays of the left lower leg were ordered as was ultrasound to rule out deep vein thrombosis.  Please see ED course for further medical decision making.  ----      Differential diagnoses considered include but are not limited to: DVT, soft tissue injury, osseous injury,     Social Determinants of Health which Significantly Impact Care: None identified     EKG Independent Interpretation: EKG interpreted by myself. Please see ED Course for full interpretation.    Independent Result Review and Interpretation: Relevant laboratory and radiographic results were reviewed and independently interpreted by myself.  As necessary, they are commented on in the ED Course.    Chronic conditions affecting the patient's care: As documented above in [facility]    The patient was discussed with the following consultants/services: None    Care Considerations: As documented above in [facility]    ED Course:  ED Course as of 24 1628   Tue 2024   1625 XR knee left 1-2 views  FINDINGS:    No acute fractures or dislocations are visualized within the left  knee.  There is no evidence of patellar subluxation.  No suprapatellar  effusion is noted.  No spurring or erosions are visualized.  IMPRESSION:  Unremarkable study of the left knee.   [PV]   1628 XR tibia fibula left 2 views  FINDINGS:    There is no displaced fracture.  The alignment is anatomic.  No soft  tissue abnormality is seen.  IMPRESSION:  No acute findings.   [PV]   1628 XR foot left 3+ views  FINDINGS:    No acute fractures or  dislocations are visualized within the left  foot.  No Lisfranc's deformity is noted.  There is minimal spurring at  the first metatarsophalangeal joint space.  A subchondral cyst is seen  at the head of the first metatarsal bone.  IMPRESSION:  1.  No acute osseous findings involving the left foot.  2.  Mild degenerative changes at the first metatarsophalangeal joint  space.   [PV]      ED Course User Index  [PV] Elian Malone DO     Disposition   Patient was signed out to Dr. Bran at 1700 pending completion of their work-up.  Please see the next provider's transition of care note for the remainder of the patient's care.     Procedures   Procedures    Patient seen and discussed with ED attending physician.    Elian Malone DO  Emergency Medicine     Elian Malone DO  Resident  11/05/24 0611

## 2024-11-05 NOTE — DISCHARGE INSTRUCTIONS
Please follow-up your PCP in the next week.  Please start taking Eliquis medication as prescribed.  Please return close to ED if develop any worsening leg swelling, chest pain, shortness of breath, weakness, or loss of function.

## 2024-11-05 NOTE — PATIENT INSTRUCTIONS
Go to the nearest ER for further evaluation of your isolated lower extremity swelling to rule out deep vein thrombosis.

## 2024-11-05 NOTE — ED PROVIDER NOTES
Emergency Medicine Transition of Care Note.    I received Chance Chiu in signout from Dr. Malone.  Please see the previous ED provider note for all HPI, PE and MDM up to the time of signout at 1630. This is in addition to the primary record.    In brief Chance Chiu is an 69 y.o. male presenting for   Chief Complaint   Patient presents with    Leg Swelling     Pt with left lower leg and foot swelling that started last week.      At the time of signout we were awaiting: DVT ultrasound results.    ED Course as of 11/05/24 1805   Tue Nov 05, 2024   1625 XR knee left 1-2 views  FINDINGS:    No acute fractures or dislocations are visualized within the left  knee.  There is no evidence of patellar subluxation.  No suprapatellar  effusion is noted.  No spurring or erosions are visualized.  IMPRESSION:  Unremarkable study of the left knee.   [PV]   1628 XR tibia fibula left 2 views  FINDINGS:    There is no displaced fracture.  The alignment is anatomic.  No soft  tissue abnormality is seen.  IMPRESSION:  No acute findings.   [PV]   1628 XR foot left 3+ views  FINDINGS:    No acute fractures or dislocations are visualized within the left  foot.  No Lisfranc's deformity is noted.  There is minimal spurring at  the first metatarsophalangeal joint space.  A subchondral cyst is seen  at the head of the first metatarsal bone.  IMPRESSION:  1.  No acute osseous findings involving the left foot.  2.  Mild degenerative changes at the first metatarsophalangeal joint  space.   [PV]   1705 Lower extremity venous duplex left  Right Lower Venous: The right common femoral vein demonstrates normal spontaneous and respirophasic flow.  Left Lower Venous: There is acute occlusive deep vein thrombosis visualized in the gastrocnemius veins in the calf vein. The remainder of the left leg is negative for deep vein thrombosis. Peroneal veins not well visualized. Visualized in segments with color Doppler. There is acute occlusive  thrombosis visualized in the proximal calf GSV, mid calf GSV and distal calf GSV.      [MI]   1803 Patient started on Eliquis, with loading dose in ED.  Patient given remaining course through outpatient prescription.  Patient hemodynamically stable at this time.  Patient follow-up with his PCP.  Diagnostic findings and treatment plan discussed with patient.  Patient amenable to plan. [MI]      ED Course User Index  [MI] Delvin Bran MD  [PV] Elian Malone DO         Diagnoses as of 11/05/24 1805   Deep vein thrombosis (DVT) of proximal vein of left lower extremity, unspecified chronicity (Multi)       Medical Decision Making      Final diagnoses:   [I82.4Y2] Deep vein thrombosis (DVT) of proximal vein of left lower extremity, unspecified chronicity (Multi)           Procedure  Procedures    MD Delvin Vance MD  Resident  11/05/24 1805

## 2024-11-12 ENCOUNTER — APPOINTMENT (OUTPATIENT)
Dept: PRIMARY CARE | Facility: CLINIC | Age: 69
End: 2024-11-12
Payer: MEDICARE

## 2024-11-12 VITALS
SYSTOLIC BLOOD PRESSURE: 120 MMHG | BODY MASS INDEX: 22.22 KG/M2 | RESPIRATION RATE: 18 BRPM | OXYGEN SATURATION: 97 % | HEART RATE: 80 BPM | TEMPERATURE: 98.4 F | HEIGHT: 69 IN | WEIGHT: 150 LBS | DIASTOLIC BLOOD PRESSURE: 70 MMHG

## 2024-11-12 DIAGNOSIS — I82.4Y2 DEEP VEIN THROMBOSIS (DVT) OF PROXIMAL VEIN OF LEFT LOWER EXTREMITY, UNSPECIFIED CHRONICITY (MULTI): Primary | ICD-10-CM

## 2024-11-12 PROCEDURE — 1160F RVW MEDS BY RX/DR IN RCRD: CPT | Performed by: FAMILY MEDICINE

## 2024-11-12 PROCEDURE — 1159F MED LIST DOCD IN RCRD: CPT | Performed by: FAMILY MEDICINE

## 2024-11-12 PROCEDURE — 99214 OFFICE O/P EST MOD 30 MIN: CPT | Performed by: FAMILY MEDICINE

## 2024-11-12 PROCEDURE — 3008F BODY MASS INDEX DOCD: CPT | Performed by: FAMILY MEDICINE

## 2024-11-12 PROCEDURE — 1157F ADVNC CARE PLAN IN RCRD: CPT | Performed by: FAMILY MEDICINE

## 2024-11-12 PROCEDURE — 1036F TOBACCO NON-USER: CPT | Performed by: FAMILY MEDICINE

## 2024-11-12 ASSESSMENT — ENCOUNTER SYMPTOMS
SHORTNESS OF BREATH: 0
HEADACHES: 0

## 2024-11-12 NOTE — PROGRESS NOTES
"Subjective     Chance Chiu is a 69 y.o. male who presents for ER Follow-up (Deep vein thrombosis (DVT) of proximal vein of left lower extremity, unspecified chronicity).    ER Follow-up  Pertinent negatives include no chest pain or headaches.        Pt is following from [facility] ER on 11/5/24 for left gastroc DVT , started on eliquis at that time.  He first noted his left plantar foot started to hurt then the pain spread to left lower calf.  He also developed significant swelling.   He is not sure what provoked his DVT.  No recent long distance travel. No prior hx of DVT or PE.  Nonsmoker.  No recent injury to leg.  Pt is doing well.  He reports his left calf pain and swelling has improved.  He denies side effects to the eliquis.  No bleeding issues. Patient denies chest pain, shortness of breath, dizziness.     No family hx of clotting disorders.       Xray of left knee, fibula -  11/5/24 - unremarkable  Xray of left foot - 11/5/24 - mild degenerative changes at first MTP joint space.  No acute findings.   Left Venous duplex 11/5/24 - There is acute occlusive deep vein thrombosis visualized in the gastrocnemius veins in the calf vein.    Pt was diagnosed with glaucoma by his eye specialist.  He is on cosopt eye drops    He also takes flomax for BPH.  He sees  urology.  No hx of prostate cancer.     Review of Systems   Respiratory:  Negative for shortness of breath.    Cardiovascular:  Negative for chest pain.   Neurological:  Negative for headaches.       Objective     Vitals:    11/12/24 1106 11/12/24 1128   BP: 144/76 120/70   BP Location: Right arm    Patient Position: Sitting    Pulse: 80    Resp: 18    Temp: 36.9 °C (98.4 °F)    SpO2: 97%    Weight: 68 kg (150 lb)    Height: 1.753 m (5' 9\")         Current Outpatient Medications   Medication Instructions    apixaban (Eliquis) 5 mg tablet Take 2 tablets (10 mg) by mouth 2 times a day for 7 days, THEN 1 tablet (5 mg) 2 times a day for 23 days.    apixaban " (ELIQUIS) 5 mg, oral, 2 times daily    dorzolamide-timoloL (Cosopt) 22.3-6.8 mg/mL ophthalmic solution INSTILL 1 DROP TWICE DAILY IN BOTH EYES    tamsulosin (FLOMAX) 0.8 mg, oral, Daily        No Known Allergies     Past Surgical History:   Procedure Laterality Date    OTHER SURGICAL HISTORY  05/15/2019    Hernia repair    OTHER SURGICAL HISTORY  05/24/2022    Prostate biopsy    OTHER SURGICAL HISTORY  05/24/2022    Insertion of prostatic urethral lift implant        Social History     Tobacco Use    Smoking status: Never    Smokeless tobacco: Never   Vaping Use    Vaping status: Never Used        No family history on file.     Immunization History   Administered Date(s) Administered    Flu vaccine, quadrivalent, high-dose, preservative free, age 65y+ (FLUZONE) 10/07/2021, 10/12/2022, 10/13/2023    Flu vaccine, quadrivalent, no egg protein, age 6 month or greater (FLUCELVAX) 11/03/2017    Influenza, injectable, quadrivalent 10/23/2019    Pfizer COVID-19 vaccine, 12 years and older, (30mcg/0.3mL) (Comirnaty) 10/13/2023, 06/04/2024    Pfizer COVID-19 vaccine, bivalent, age 12 years and older (30 mcg/0.3 mL) 10/12/2022    Pfizer Gray Cap SARS-CoV-2 04/14/2022    Pfizer Purple Cap SARS-CoV-2 03/11/2021, 04/08/2021, 10/07/2021    Pneumococcal conjugate vaccine, 20-valent (PREVNAR 20) 06/15/2022    RESPIRATORY SYNCYTIAL VIRUS (RSV), ELIGIBLE PREGNANT PTS, 0.5 ML (ABRYSVO) 10/13/2023    Zoster vaccine, recombinant, adult (SHINGRIX) 04/14/2022, 06/15/2022        Physical Exam  Vitals reviewed.   Constitutional:       General: He is not in acute distress.     Appearance: Normal appearance. He is well-developed.   HENT:      Head: Normocephalic and atraumatic.   Eyes:      General: Lids are normal.      Conjunctiva/sclera:      Right eye: Right conjunctiva is not injected.      Left eye: Left conjunctiva is not injected.   Cardiovascular:      Rate and Rhythm: Normal rate and regular rhythm.      Heart sounds: No murmur  heard.  Pulmonary:      Effort: Pulmonary effort is normal. No respiratory distress.      Breath sounds: Normal breath sounds. No wheezing, rhonchi or rales.   Musculoskeletal:      Comments: no ttp over left proximal calf.  Slight swelling.  No erythema.     Skin:     General: Skin is warm and dry.      Findings: No rash.   Neurological:      Mental Status: He is alert and oriented to person, place, and time. Mental status is at baseline.   Psychiatric:         Mood and Affect: Mood normal.         Behavior: Behavior normal.         Assessment & Plan  Deep vein thrombosis (DVT) of proximal vein of left lower extremity, unspecified chronicity (Multi)  S/p [facility] ER on 11/5/24 for left gastroc DVT , started on eliquis on 11/5/24.  Pt will complete 3 month course.  Unprovoked DVT.  Pt is doing better, less pain and swelling today.       Orders:    apixaban (Eliquis) 5 mg tablet; Take 1 tablet (5 mg) by mouth 2 times a day.

## 2025-01-21 ENCOUNTER — TELEPHONE (OUTPATIENT)
Facility: CLINIC | Age: 70
End: 2025-01-21
Payer: MEDICARE

## 2025-01-21 DIAGNOSIS — D72.819 LEUKOPENIA, UNSPECIFIED TYPE: ICD-10-CM

## 2025-01-21 DIAGNOSIS — Z00.00 ROUTINE GENERAL MEDICAL EXAMINATION AT HEALTH CARE FACILITY: ICD-10-CM

## 2025-01-21 DIAGNOSIS — R35.0 BENIGN PROSTATIC HYPERPLASIA WITH URINARY FREQUENCY: Primary | ICD-10-CM

## 2025-01-21 DIAGNOSIS — N40.1 BENIGN PROSTATIC HYPERPLASIA WITH URINARY FREQUENCY: Primary | ICD-10-CM

## 2025-01-21 NOTE — TELEPHONE ENCOUNTER
Pt called asking if his preventative labs could be put in before his physical next week. Please advise.

## 2025-01-23 ENCOUNTER — LAB (OUTPATIENT)
Dept: LAB | Facility: LAB | Age: 70
End: 2025-01-23
Payer: MEDICARE

## 2025-01-23 DIAGNOSIS — D72.819 LEUKOPENIA, UNSPECIFIED TYPE: ICD-10-CM

## 2025-01-23 DIAGNOSIS — Z00.00 ROUTINE GENERAL MEDICAL EXAMINATION AT HEALTH CARE FACILITY: ICD-10-CM

## 2025-01-23 DIAGNOSIS — N40.1 BENIGN PROSTATIC HYPERPLASIA WITH URINARY FREQUENCY: ICD-10-CM

## 2025-01-23 DIAGNOSIS — R35.0 BENIGN PROSTATIC HYPERPLASIA WITH URINARY FREQUENCY: ICD-10-CM

## 2025-01-23 LAB
ALBUMIN SERPL BCP-MCNC: 4.3 G/DL (ref 3.4–5)
ALP SERPL-CCNC: 57 U/L (ref 33–136)
ALT SERPL W P-5'-P-CCNC: 17 U/L (ref 10–52)
ANION GAP SERPL CALC-SCNC: 14 MMOL/L (ref 10–20)
AST SERPL W P-5'-P-CCNC: 17 U/L (ref 9–39)
BASOPHILS # BLD AUTO: 0.04 X10*3/UL (ref 0–0.1)
BASOPHILS NFR BLD AUTO: 0.9 %
BILIRUB SERPL-MCNC: 0.6 MG/DL (ref 0–1.2)
BUN SERPL-MCNC: 24 MG/DL (ref 6–23)
CALCIUM SERPL-MCNC: 8.8 MG/DL (ref 8.6–10.3)
CHLORIDE SERPL-SCNC: 103 MMOL/L (ref 98–107)
CHOLEST SERPL-MCNC: 173 MG/DL (ref 0–199)
CHOLESTEROL/HDL RATIO: 2.5
CO2 SERPL-SCNC: 26 MMOL/L (ref 21–32)
CREAT SERPL-MCNC: 0.79 MG/DL (ref 0.5–1.3)
EGFRCR SERPLBLD CKD-EPI 2021: >90 ML/MIN/1.73M*2
EOSINOPHIL # BLD AUTO: 0.08 X10*3/UL (ref 0–0.7)
EOSINOPHIL NFR BLD AUTO: 1.9 %
ERYTHROCYTE [DISTWIDTH] IN BLOOD BY AUTOMATED COUNT: 13.1 % (ref 11.5–14.5)
GLUCOSE SERPL-MCNC: 107 MG/DL (ref 74–99)
HCT VFR BLD AUTO: 41.2 % (ref 41–52)
HDLC SERPL-MCNC: 69.1 MG/DL
HGB BLD-MCNC: 13.7 G/DL (ref 13.5–17.5)
IMM GRANULOCYTES # BLD AUTO: 0.01 X10*3/UL (ref 0–0.7)
IMM GRANULOCYTES NFR BLD AUTO: 0.2 % (ref 0–0.9)
LDLC SERPL CALC-MCNC: 95 MG/DL
LYMPHOCYTES # BLD AUTO: 1.33 X10*3/UL (ref 1.2–4.8)
LYMPHOCYTES NFR BLD AUTO: 30.8 %
MCH RBC QN AUTO: 31.9 PG (ref 26–34)
MCHC RBC AUTO-ENTMCNC: 33.3 G/DL (ref 32–36)
MCV RBC AUTO: 96 FL (ref 80–100)
MONOCYTES # BLD AUTO: 0.36 X10*3/UL (ref 0.1–1)
MONOCYTES NFR BLD AUTO: 8.3 %
NEUTROPHILS # BLD AUTO: 2.5 X10*3/UL (ref 1.2–7.7)
NEUTROPHILS NFR BLD AUTO: 57.9 %
NON HDL CHOLESTEROL: 104 MG/DL (ref 0–149)
NRBC BLD-RTO: 0 /100 WBCS (ref 0–0)
PLATELET # BLD AUTO: 190 X10*3/UL (ref 150–450)
POTASSIUM SERPL-SCNC: 4.1 MMOL/L (ref 3.5–5.3)
PROT SERPL-MCNC: 6.5 G/DL (ref 6.4–8.2)
PSA SERPL-MCNC: 0.89 NG/ML
RBC # BLD AUTO: 4.29 X10*6/UL (ref 4.5–5.9)
SODIUM SERPL-SCNC: 139 MMOL/L (ref 136–145)
TRIGL SERPL-MCNC: 45 MG/DL (ref 0–149)
VLDL: 9 MG/DL (ref 0–40)
WBC # BLD AUTO: 4.3 X10*3/UL (ref 4.4–11.3)

## 2025-01-23 PROCEDURE — 85025 COMPLETE CBC W/AUTO DIFF WBC: CPT

## 2025-01-23 PROCEDURE — 80053 COMPREHEN METABOLIC PANEL: CPT

## 2025-01-23 PROCEDURE — 84153 ASSAY OF PSA TOTAL: CPT

## 2025-01-23 PROCEDURE — 80061 LIPID PANEL: CPT

## 2025-01-29 ENCOUNTER — APPOINTMENT (OUTPATIENT)
Facility: CLINIC | Age: 70
End: 2025-01-29
Payer: MEDICARE

## 2025-01-29 VITALS
WEIGHT: 154 LBS | BODY MASS INDEX: 22.81 KG/M2 | OXYGEN SATURATION: 98 % | HEIGHT: 69 IN | TEMPERATURE: 97.1 F | HEART RATE: 70 BPM | RESPIRATION RATE: 18 BRPM | SYSTOLIC BLOOD PRESSURE: 112 MMHG | DIASTOLIC BLOOD PRESSURE: 68 MMHG

## 2025-01-29 DIAGNOSIS — Z12.11 ENCOUNTER FOR SCREENING FOR MALIGNANT NEOPLASM OF COLON: ICD-10-CM

## 2025-01-29 DIAGNOSIS — Z00.00 ROUTINE GENERAL MEDICAL EXAMINATION AT HEALTH CARE FACILITY: Primary | ICD-10-CM

## 2025-01-29 DIAGNOSIS — N40.1 BENIGN PROSTATIC HYPERPLASIA WITH LOWER URINARY TRACT SYMPTOMS, SYMPTOM DETAILS UNSPECIFIED: ICD-10-CM

## 2025-01-29 DIAGNOSIS — Z86.718 HISTORY OF DVT (DEEP VEIN THROMBOSIS): ICD-10-CM

## 2025-01-29 DIAGNOSIS — D72.819 LEUKOPENIA, UNSPECIFIED TYPE: ICD-10-CM

## 2025-01-29 PROCEDURE — 1158F ADVNC CARE PLAN TLK DOCD: CPT | Performed by: FAMILY MEDICINE

## 2025-01-29 PROCEDURE — 1157F ADVNC CARE PLAN IN RCRD: CPT | Performed by: FAMILY MEDICINE

## 2025-01-29 PROCEDURE — 1036F TOBACCO NON-USER: CPT | Performed by: FAMILY MEDICINE

## 2025-01-29 PROCEDURE — G0439 PPPS, SUBSEQ VISIT: HCPCS | Performed by: FAMILY MEDICINE

## 2025-01-29 PROCEDURE — 99213 OFFICE O/P EST LOW 20 MIN: CPT | Performed by: FAMILY MEDICINE

## 2025-01-29 PROCEDURE — 3008F BODY MASS INDEX DOCD: CPT | Performed by: FAMILY MEDICINE

## 2025-01-29 PROCEDURE — 1123F ACP DISCUSS/DSCN MKR DOCD: CPT | Performed by: FAMILY MEDICINE

## 2025-01-29 PROCEDURE — 1159F MED LIST DOCD IN RCRD: CPT | Performed by: FAMILY MEDICINE

## 2025-01-29 PROCEDURE — 1160F RVW MEDS BY RX/DR IN RCRD: CPT | Performed by: FAMILY MEDICINE

## 2025-01-29 PROCEDURE — 1170F FXNL STATUS ASSESSED: CPT | Performed by: FAMILY MEDICINE

## 2025-01-29 ASSESSMENT — ENCOUNTER SYMPTOMS
SHORTNESS OF BREATH: 0
HEADACHES: 0

## 2025-01-29 ASSESSMENT — PATIENT HEALTH QUESTIONNAIRE - PHQ9
SUM OF ALL RESPONSES TO PHQ9 QUESTIONS 1 AND 2: 0
2. FEELING DOWN, DEPRESSED OR HOPELESS: NOT AT ALL
1. LITTLE INTEREST OR PLEASURE IN DOING THINGS: NOT AT ALL

## 2025-01-29 ASSESSMENT — ACTIVITIES OF DAILY LIVING (ADL)
DRESSING: INDEPENDENT
MANAGING_FINANCES: INDEPENDENT
BATHING: INDEPENDENT
GROCERY_SHOPPING: INDEPENDENT
TAKING_MEDICATION: INDEPENDENT
DOING_HOUSEWORK: INDEPENDENT

## 2025-01-29 NOTE — PROGRESS NOTES
"Subjective     Chance Chiu is a 69 y.o. male who presents for Medicare Annual Wellness Visit Subsequent.    HPI     Pt is here today for annual well exam.    He is also here today to follow up on hx of unprovoked DVT in Left gastroc vein , started on eliquis on 11/5/24.  He will be done with his 3 month supply of eliquis next week.  He feels well.      Left Venous duplex 11/5/24 - There is acute occlusive deep vein thrombosis visualized in the gastrocnemius veins in the calf vein.    Pt denies any calf pain in his legs.      Hx of glaucoma by his eye specialist.       He also takes flomax for BPH.  He sees  urology, Dr. Campos.  No hx of prostate cancer.     He has hx of mild leukopenia.      He had recent routine fasting labs done which were reviewed today.     Review of Systems   Respiratory:  Negative for shortness of breath.    Cardiovascular:  Negative for chest pain.   Neurological:  Negative for headaches.       Objective     Vitals:    01/29/25 1334   BP: 112/68   BP Location: Right arm   Patient Position: Sitting   Pulse: 70   Resp: 18   Temp: 36.2 °C (97.1 °F)   TempSrc: Temporal   SpO2: 98%   Weight: 69.9 kg (154 lb)   Height: 1.753 m (5' 9\")        Current Outpatient Medications   Medication Instructions    dorzolamide-timoloL (Cosopt) 22.3-6.8 mg/mL ophthalmic solution INSTILL 1 DROP TWICE DAILY IN BOTH EYES    tamsulosin (FLOMAX) 0.8 mg, oral, Daily        No Known Allergies     Past Surgical History:   Procedure Laterality Date    EYE SURGERY  07/03/2024    HERNIA REPAIR      OTHER SURGICAL HISTORY  05/24/2022    Prostate biopsy    OTHER SURGICAL HISTORY  05/24/2022    Insertion of prostatic urethral lift implant        Social History     Tobacco Use    Smoking status: Never    Smokeless tobacco: Never   Vaping Use    Vaping status: Never Used   Substance Use Topics    Alcohol use: Never    Drug use: Never        Family History   Problem Relation Name Age of Onset    Heart disease Father Chance MARIE" Aram         Immunization History   Administered Date(s) Administered    COVID-19, mRNA, LNP-S, PF, 30 mcg/0.3 mL dose 03/11/2021, 04/08/2021, 10/07/2021    Flu vaccine (IIV4), preservative free *Check age/dose* 10/24/2018    Flu vaccine, quadrivalent, high-dose, preservative free, age 65y+ (FLUZONE) 10/07/2021, 10/12/2022, 10/13/2023    Flu vaccine, quadrivalent, no egg protein, age 6 month or greater (FLUCELVAX) 11/03/2017    Flu vaccine, trivalent, preservative free, HIGH-DOSE, age 65y+ (Fluzone) 12/10/2024    Flu vaccine, trivalent, preservative free, age 6 months and greater (Fluarix/Fluzone/Flulaval) 10/29/2015    Influenza, injectable, quadrivalent 10/23/2019    Pfizer COVID-19 vaccine, 12 years and older, (30mcg/0.3mL) (Comirnaty) 10/13/2023, 06/04/2024, 12/10/2024    Pfizer COVID-19 vaccine, bivalent, age 12 years and older (30 mcg/0.3 mL) 10/12/2022    Pfizer Gray Cap SARS-CoV-2 04/14/2022    Pneumococcal conjugate vaccine, 20-valent (PREVNAR 20) 06/15/2022    RESPIRATORY SYNCYTIAL VIRUS (RSV), ELIGIBLE PREGNANT PTS, 0.5 ML (ABRYSVO) 10/13/2023    Zoster vaccine, recombinant, adult (SHINGRIX) 04/14/2022, 06/15/2022        Physical Exam  Vitals reviewed.   Constitutional:       General: He is not in acute distress.     Appearance: Normal appearance. He is not ill-appearing.   HENT:      Head: Normocephalic and atraumatic.      Right Ear: Tympanic membrane, ear canal and external ear normal.      Left Ear: Tympanic membrane, ear canal and external ear normal.      Mouth/Throat:      Mouth: Mucous membranes are moist.      Pharynx: No oropharyngeal exudate or posterior oropharyngeal erythema.   Neck:      Thyroid: No thyroid mass or thyromegaly.   Cardiovascular:      Rate and Rhythm: Normal rate and regular rhythm.      Heart sounds: No murmur heard.  Pulmonary:      Effort: No respiratory distress.      Breath sounds: Normal breath sounds. No wheezing, rhonchi or rales.   Abdominal:      General: There  is no distension.      Palpations: Abdomen is soft.      Tenderness: There is no abdominal tenderness.   Musculoskeletal:         General: Normal range of motion.      Comments: No calf ttp.    No calf swelling or erythema    Lymphadenopathy:      Cervical: No cervical adenopathy.   Skin:     General: Skin is warm and dry.      Findings: No rash.   Neurological:      Mental Status: He is alert and oriented to person, place, and time. Mental status is at baseline.   Psychiatric:         Mood and Affect: Mood normal.         Behavior: Behavior normal.         Assessment & Plan  Routine general medical examination at health care facility  Medicare well exam    Colon screening - cologuard due, ordered. Pt declined colonoscopy. Last cologuard in 2022 was negative.    Dexa scan status - declined    Shingrix vaccine status - utd     Pneumonia vaccine status - utd with prevnar 20    Flu vaccine utd    I recommend yearly flu vaccine and routine COVID vaccinations as indicated     Healthy diet, routine exercise was discussed with patient      Living will/MPOA discussed    Screening questions completed (Fall /depression/ADL/IADL/Home Safety/Functional ability)   Orders:    1 Year Follow Up In Primary Care - Wellness Exam; Future    Benign prostatic hyperplasia with lower urinary tract symptoms, symptom details unspecified  Sees urology, on flomax       Leukopenia, unspecified type         History of DVT (deep vein thrombosis)  hx of unprovoked DVT in Left gastroc vein , started on eliquis on 11/5/24.  Will be done with his 90 day supply of eliquis next week.         Encounter for screening for malignant neoplasm of colon    Orders:    Cologuard® colon cancer screening; Future

## 2025-01-29 NOTE — ASSESSMENT & PLAN NOTE
hx of unprovoked DVT in Left gastroc vein , started on eliquis on 11/5/24.  Will be done with his 90 day supply of eliquis next week.

## 2025-03-11 NOTE — PROGRESS NOTES
HPI    70 y.o. male being seen with the following problem list:    Problem list:  Urinary retention, now s/p HOLEP and 100 units of Botox 5/30/23. Path- 6.2g benign.     Per Dr. Bassett note dated 6/15/23   He subsequently passed TOV and learned ISC as he was concerned regarding possible recurrence of retention because this has been an issue for him in the past. He states that his symptoms have slightly improved since his last visit. He is still taking tamsulosin and denies needing to self catheterize. He feels that he is emptying well. Denies SS of UTI. Pleased at this time.     6/29/23- Patient is emptying bladder well. No retention. No issues. No cath. Tried stopping flomax, but feels like he still needs it. LUTS are significantly improved from surgery, does not want to do anything different at this time.     9/27/23 - seen via thv for a 3mo fuv sp HoLEP w botox. The patient is on flomax still, wants to take 2 a day. not having to cath. stream is strong.      1/19/24 - seen via  for symptom check. Continues on Flomax. Reports continued bothersome frequency and nocturia. He is getting up to void every 2 hours and reports a weak stream during the night. His stream is strong during the day.     3/6/24 - seen today via thv for 6 week symptom check after starting Vesicare 5mg po qd. Stopped the vesicare, no better. Has been told in the past he has a small bladder, he recalls 160 or so ml. Not really that bothered at this point.      09/12/24 - Doing well, urinary symptoms not too bothersome. Would like to continue on 0.8mg flomax. Does not want to do anything differently    03/12/25 - Seen today over telehealth, performed this visit using real-time telehealth tools, including an audio/video connection between Chance Chiu at home and Eusebio Campos MD at [facility]. Consent for telemedicine visit was obtained.   Urinary symptoms fairly stable on Flomax 0.8mg. Urinary well, no bothersome issues. No cath.        Lab Results   Component Value Date    PSA 0.89 01/23/2025    PSA 1.03 01/10/2024    PSA 1.79 10/12/2021    PSA 0.80 10/08/2020    PSA 1.93 04/26/2019              Current Medications:  Current Outpatient Medications   Medication Sig Dispense Refill    dorzolamide-timoloL (Cosopt) 22.3-6.8 mg/mL ophthalmic solution INSTILL 1 DROP TWICE DAILY IN BOTH EYES      tamsulosin (Flomax) 0.4 mg 24 hr capsule Take 2 capsules (0.8 mg) by mouth once daily. 180 capsule 3     No current facility-administered medications for this visit.        Active Problems:  Chance Chiu is a 70 y.o. male with the following Problems and Medications.  Patient Active Problem List   Diagnosis    Leukopenia    Enlarged prostate with lower urinary tract symptoms (LUTS)    Anemia    Diverticulosis of colon    Calcium stone of bladder    Fibromyalgia    IBS (irritable bowel syndrome)    Increased prostate specific antigen (PSA) velocity    Liver lesion    Paresthesia    Protein-calorie malnutrition, unspecified severity (Multi)    History of DVT (deep vein thrombosis)     Current Outpatient Medications   Medication Sig Dispense Refill    dorzolamide-timoloL (Cosopt) 22.3-6.8 mg/mL ophthalmic solution INSTILL 1 DROP TWICE DAILY IN BOTH EYES      tamsulosin (Flomax) 0.4 mg 24 hr capsule Take 2 capsules (0.8 mg) by mouth once daily. 180 capsule 3     No current facility-administered medications for this visit.       PMH:  Past Medical History:   Diagnosis Date    Cataract 07/02/2024    Glaucoma 07/02/2024    Personal history of other diseases of male genital organs 10/23/2019    History of benign prostatic hyperplasia    Personal history of other diseases of the digestive system     History of irritable bowel syndrome    Personal history of other diseases of the digestive system     History of diverticulosis    Personal history of other specified conditions 03/28/2022    History of urinary retention       PSH:  Past Surgical History:   Procedure  Laterality Date    EYE SURGERY  07/03/2024    HERNIA REPAIR      OTHER SURGICAL HISTORY  05/24/2022    Prostate biopsy    OTHER SURGICAL HISTORY  05/24/2022    Insertion of prostatic urethral lift implant       FMH:  Family History   Problem Relation Name Age of Onset    Heart disease Father Chance Chiu        SHx:  Social History     Tobacco Use    Smoking status: Never    Smokeless tobacco: Never   Vaping Use    Vaping status: Never Used   Substance Use Topics    Alcohol use: Never    Drug use: Never       Allergies:  No Known Allergies    Assessment/Plan  Urinary symptoms stable on Flomax 0.8mg, would like to continue meds. No refill required at the moment.  PSA is low, reassuring. Will repeat in 1 yr    FU in 1 year over TH with PSA prior.     Scribe Attestation  By signing my name below, I, Macy Wallis, attest that this documentation has been prepared under the direction and in the presence of Eusebio Campos MD.

## 2025-03-12 ENCOUNTER — TELEMEDICINE (OUTPATIENT)
Dept: UROLOGY | Facility: HOSPITAL | Age: 70
End: 2025-03-12
Payer: MEDICARE

## 2025-03-12 DIAGNOSIS — Z12.5 SCREENING PSA (PROSTATE SPECIFIC ANTIGEN): Primary | ICD-10-CM

## 2025-03-12 DIAGNOSIS — N40.1 BENIGN PROSTATIC HYPERPLASIA WITH URINARY FREQUENCY: ICD-10-CM

## 2025-03-12 DIAGNOSIS — R35.0 BENIGN PROSTATIC HYPERPLASIA WITH URINARY FREQUENCY: ICD-10-CM

## 2025-03-12 PROCEDURE — 99213 OFFICE O/P EST LOW 20 MIN: CPT | Performed by: UROLOGY

## 2025-03-12 PROCEDURE — 1157F ADVNC CARE PLAN IN RCRD: CPT | Performed by: UROLOGY

## 2025-03-12 PROCEDURE — G2211 COMPLEX E/M VISIT ADD ON: HCPCS | Performed by: UROLOGY

## 2025-03-12 PROCEDURE — 1123F ACP DISCUSS/DSCN MKR DOCD: CPT | Performed by: UROLOGY

## 2025-08-05 LAB — NONINV COLON CA DNA+OCC BLD SCRN STL QL: NEGATIVE
